# Patient Record
Sex: MALE | Race: WHITE | NOT HISPANIC OR LATINO | URBAN - METROPOLITAN AREA
[De-identification: names, ages, dates, MRNs, and addresses within clinical notes are randomized per-mention and may not be internally consistent; named-entity substitution may affect disease eponyms.]

---

## 2018-11-02 ENCOUNTER — OFFICE VISIT CONVERTED (OUTPATIENT)
Dept: FAMILY MEDICINE CLINIC | Facility: CLINIC | Age: 48
End: 2018-11-02
Attending: NURSE PRACTITIONER

## 2019-03-27 ENCOUNTER — CONVERSION ENCOUNTER (OUTPATIENT)
Dept: FAMILY MEDICINE CLINIC | Facility: CLINIC | Age: 49
End: 2019-03-27

## 2019-03-27 ENCOUNTER — OFFICE VISIT CONVERTED (OUTPATIENT)
Dept: FAMILY MEDICINE CLINIC | Facility: CLINIC | Age: 49
End: 2019-03-27
Attending: NURSE PRACTITIONER

## 2021-05-15 VITALS
DIASTOLIC BLOOD PRESSURE: 79 MMHG | TEMPERATURE: 97.9 F | HEIGHT: 68 IN | OXYGEN SATURATION: 97 % | BODY MASS INDEX: 28.58 KG/M2 | WEIGHT: 188.56 LBS | SYSTOLIC BLOOD PRESSURE: 134 MMHG | RESPIRATION RATE: 16 BRPM | HEART RATE: 66 BPM

## 2021-05-16 VITALS
RESPIRATION RATE: 14 BRPM | OXYGEN SATURATION: 97 % | SYSTOLIC BLOOD PRESSURE: 128 MMHG | HEART RATE: 69 BPM | DIASTOLIC BLOOD PRESSURE: 89 MMHG | WEIGHT: 184.12 LBS | TEMPERATURE: 97.7 F | HEIGHT: 68 IN | BODY MASS INDEX: 27.91 KG/M2

## 2024-07-16 ENCOUNTER — HOSPITAL ENCOUNTER (EMERGENCY)
Facility: HOSPITAL | Age: 54
Discharge: HOME | End: 2024-07-16
Attending: EMERGENCY MEDICINE
Payer: OTHER GOVERNMENT

## 2024-07-16 ENCOUNTER — APPOINTMENT (OUTPATIENT)
Dept: GENERAL RADIOLOGY | Facility: HOSPITAL | Age: 54
End: 2024-07-16
Payer: OTHER GOVERNMENT

## 2024-07-16 VITALS
DIASTOLIC BLOOD PRESSURE: 75 MMHG | BODY MASS INDEX: 28.25 KG/M2 | RESPIRATION RATE: 18 BRPM | SYSTOLIC BLOOD PRESSURE: 119 MMHG | HEART RATE: 64 BPM | HEIGHT: 67 IN | OXYGEN SATURATION: 95 % | TEMPERATURE: 98 F | WEIGHT: 180 LBS

## 2024-07-16 DIAGNOSIS — M13.871 ALLERGIC ARTHRITIS OF RIGHT ANKLE: Primary | ICD-10-CM

## 2024-07-16 PROCEDURE — 99283 EMERGENCY DEPT VISIT LOW MDM: CPT

## 2024-07-16 PROCEDURE — 73610 X-RAY EXAM OF ANKLE: CPT

## 2024-07-16 NOTE — Clinical Note
[facility] EMERGENCY ROOM  [address] [address]  Phone: [phone]  Fax: [phone]    Anthony Hughes was seen and treated in our emergency department on 7/16/2024.  He may return to work on 07/18/2024.         Thank you for choosing [facility].    Gagandeep Taylor PA-C

## 2024-07-16 NOTE — ED PROVIDER NOTES
Time: 3:53 PM EDT  Date of encounter:  7/16/2024  Independent Historian/Clinical History and Information was obtained by:   Patient and Family    History is limited by: N/A    Chief Complaint: Ankle pain      History of Present Illness:  Patient is a 53 y.o. year old male who presents to the emergency department for evaluation of ankle pain.  Patient is complaining of right ankle pain.  He does state he is a  and believes that he is chronic arthritis issues in his ankle however for the past 2 months he has been having worsening pain in the right ankle.  He states that it is mainly at night when the pain becomes severe and he will have intermittent swelling at night.  He does state that he stands all day long for his job at [employer].  Patient denies any known injury.  Patient has not been taking anything for pain control.  Patient has not been evaluated for this prior to being seen in the emergency department today as he does not have a family doctor.  No other complaints.    HPI    Patient Care Team  Primary Care Provider: Provider, No Known    Past Medical History:     No Known Allergies  History reviewed. No pertinent past medical history.  History reviewed. No pertinent surgical history.  History reviewed. No pertinent family history.    Home Medications:  Prior to Admission medications    Not on File        Social History:   Social History     Tobacco Use    Smoking status: Never    Smokeless tobacco: Never   Vaping Use    Vaping status: Never Used   Substance Use Topics    Alcohol use: Not Currently    Drug use: Never         Review of Systems:  Review of Systems   Constitutional:  Negative for fever.   Musculoskeletal:  Positive for arthralgias and joint swelling.   Skin:  Negative for color change and wound.   All other systems reviewed and are negative.       Physical Exam:  /75 (BP Location: Right arm, Patient Position: Sitting)   Pulse 64   Temp 98 °F (36.7 °C) (Oral)   Resp 18   Ht  "170.2 cm (67\")   Wt 81.6 kg (180 lb)   SpO2 95%   BMI 28.19 kg/m²     Physical Exam  Vitals and nursing note reviewed.   Constitutional:       Appearance: Normal appearance. He is normal weight.   HENT:      Head: Normocephalic and atraumatic.      Nose: Nose normal.   Eyes:      Conjunctiva/sclera: Conjunctivae normal.      Pupils: Pupils are equal, round, and reactive to light.   Cardiovascular:      Rate and Rhythm: Normal rate and regular rhythm.      Heart sounds: Normal heart sounds.   Pulmonary:      Effort: Pulmonary effort is normal.      Breath sounds: Normal breath sounds.   Abdominal:      General: Abdomen is flat. There is no distension.   Musculoskeletal:         General: Normal range of motion.      Cervical back: Normal range of motion and neck supple.      Right ankle: No swelling, deformity, ecchymosis or lacerations. Tenderness present over the medial malleolus. Normal range of motion. Normal pulse.      Right foot: Normal.   Skin:     General: Skin is warm and dry.   Neurological:      General: No focal deficit present.      Mental Status: He is alert and oriented to person, place, and time.   Psychiatric:         Mood and Affect: Mood normal.         Behavior: Behavior normal.         Thought Content: Thought content normal.         Judgment: Judgment normal.                Procedures:  Procedures      Medical Decision Making:    Comorbidities that affect care:    None    External Notes reviewed:    None      The following orders were placed and all results were independently analyzed by me:  Orders Placed This Encounter   Procedures    XR Ankle 3+ View Right       Medications Given in the Emergency Department:  Medications - No data to display     ED Course:         Labs:    Lab Results (last 24 hours)       ** No results found for the last 24 hours. **             Imaging:    XR Ankle 3+ View Right    Result Date: 7/16/2024  XR ANKLE 3+ VW RIGHT Date of Exam: 7/16/2024 4:05 PM EDT " Indication: Right ankle pain x 2 months, no known injury pain Comparison: None available. Findings: Bone mineral density is normal. Ankle mortise is intact. No fractures or dislocations. Mild subtalar and talonavicular joint space narrowing.     Impression: No acute finding of the ankle. Mild subtalar and talonavicular joint arthritis. Electronically Signed: Jacquelyn Pham MD  7/16/2024 4:14 PM EDT  Workstation ID: [id number]       Differential Diagnosis and Discussion:    Joint Pain: Differential diagnosis includes but is not limited to polyarticular arthritis, gout, tendinitis, hemarthrosis, septic arthritis, rheumatoid arthritis, bursitis, degenerative joint disease, joint effusion, autoimmune disorder, trauma, and occult neoplasm.    All X-rays impressions were independently interpreted by me.    MDM  Number of Diagnoses or Management Options  Diagnosis management comments: Patient presented to the emergency department for right ankle pain that is chronic.  X-rays were obtained and notes arthritis.  Will begin patient on diclofenac outpatient.       Amount and/or Complexity of Data Reviewed  Tests in the radiology section of CPT®: ordered and reviewed    Risk of Complications, Morbidity, and/or Mortality  Presenting problems: moderate  Diagnostic procedures: low  Management options: low    Patient Progress  Patient progress: stable       Patient Care Considerations:    NARCOTICS: I considered prescribing opiate pain medication as an outpatient, however there is no fracture on x-ray      Consultants/Shared Management Plan:    None    Social Determinants of Health:    Patient is independent, reliable, and has access to care.       Disposition and Care Coordination:    Discharged: The patient is suitable and stable for discharge with no need for consideration of admission.    I have explained the patient´s condition, diagnoses and treatment plan based on the information available to me at this time. I have answered  questions and addressed any concerns. The patient has a good  understanding of the patient´s diagnosis, condition, and treatment plan as can be expected at this point. The vital signs have been stable. The patient´s condition is stable and appropriate for discharge from the emergency department.      The patient will pursue further outpatient evaluation with the primary care physician or other designated or consulting physician as outlined in the discharge instructions. They are agreeable to this plan of care and follow-up instructions have been explained in detail. The patient has received these instructions in written format and has expressed an understanding of the discharge instructions. The patient is aware that any significant change in condition or worsening of symptoms should prompt an immediate return to this or the closest emergency department or call to 911.  I have explained discharge medications and the need for follow up with the patient/caretakers. This was also printed in the discharge instructions. Patient was discharged with the following medications and follow up:      Medication List        New Prescriptions      diclofenac 50 MG EC tablet  Commonly known as: VOLTAREN  Take 1 tablet by mouth 2 (Two) Times a Day As Needed (moderate pain).               Where to Get Your Medications        These medications were sent to [facility]/pharmacy #[id number] - [place], [place] - [address] - [phone]  - [phone]  [address] [place] [place] [address]      Hours: 24-hours Phone: [phone]   diclofenac 50 MG EC tablet      Provider, No Known  [facility]  [place] [address]             Final diagnoses:   Allergic arthritis of right ankle        ED Disposition       ED Disposition   Discharge    Condition   Stable    Comment   --               This medical record created using voice recognition software.             Gagandeep Taylor PA-C  07/16/24 [id number]

## 2024-07-16 NOTE — DISCHARGE INSTRUCTIONS
Take diclofenac as needed for pain control.  Please rest, ice, elevate the ankle when you are home to improve pain and swelling.  Establish a family doctor for further refills of arthritis medication.

## 2024-08-20 ENCOUNTER — OFFICE VISIT (OUTPATIENT)
Dept: FAMILY MEDICINE CLINIC | Facility: CLINIC | Age: 54
End: 2024-08-20
Payer: OTHER GOVERNMENT

## 2024-08-20 VITALS
DIASTOLIC BLOOD PRESSURE: 80 MMHG | HEART RATE: 66 BPM | BODY MASS INDEX: 29.6 KG/M2 | WEIGHT: 188.6 LBS | RESPIRATION RATE: 18 BRPM | TEMPERATURE: 97 F | HEIGHT: 67 IN | SYSTOLIC BLOOD PRESSURE: 130 MMHG | OXYGEN SATURATION: 99 %

## 2024-08-20 DIAGNOSIS — M79.671 PAIN IN BOTH FEET: ICD-10-CM

## 2024-08-20 DIAGNOSIS — M79.672 PAIN IN BOTH FEET: ICD-10-CM

## 2024-08-20 DIAGNOSIS — Z12.11 COLON CANCER SCREENING: ICD-10-CM

## 2024-08-20 DIAGNOSIS — Z12.5 SCREENING FOR PROSTATE CANCER: ICD-10-CM

## 2024-08-20 DIAGNOSIS — H93.13 TINNITUS OF BOTH EARS: ICD-10-CM

## 2024-08-20 DIAGNOSIS — Z00.00 ANNUAL PHYSICAL EXAM: Primary | ICD-10-CM

## 2024-08-20 DIAGNOSIS — M19.90 ARTHRITIS: ICD-10-CM

## 2024-08-20 PROCEDURE — 99213 OFFICE O/P EST LOW 20 MIN: CPT | Performed by: FAMILY MEDICINE

## 2024-08-20 PROCEDURE — 99386 PREV VISIT NEW AGE 40-64: CPT | Performed by: FAMILY MEDICINE

## 2024-08-20 RX ORDER — DICLOFENAC SODIUM 75 MG/1
75 TABLET, DELAYED RELEASE ORAL 2 TIMES DAILY
Qty: 180 TABLET | Refills: 3 | Status: SHIPPED | OUTPATIENT
Start: 2024-08-20

## 2024-08-20 RX ORDER — TRIAMCINOLONE ACETONIDE 0.25 MG/G
1 OINTMENT TOPICAL 2 TIMES DAILY
Qty: 56 G | Refills: 0 | Status: CANCELLED | OUTPATIENT
Start: 2024-08-20 | End: 2024-09-17

## 2024-08-20 RX ORDER — TRIAMCINOLONE ACETONIDE 1 MG/G
1 CREAM TOPICAL 2 TIMES DAILY
Qty: 80 G | Refills: 1 | Status: SHIPPED | OUTPATIENT
Start: 2024-08-20

## 2024-08-20 NOTE — PROGRESS NOTES
"Chief Complaint  Knee Pain (Right knee.), Joint Swelling (Right elbow.), Rash (Head and face, dry skin, x10 years.), Diarrhea, Establish Care, and Hearing Problem (X1 year or longer.)    Subjective        Anthony Hughes presents to [facility]  History of Present Illness  Mr. Hughes is a 53yo gentleman, who served in the [employer], here to establish care.  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    He has foot pain in both feet but is bothered by the right side more than than left.  He uses supplements to help.  Will refer to podiatry.    He has ringing in his ears and decreased hearing for the last 10+ years.  Will refer to ENT/Audiologist.    Rash on head/face (1 year, scaly)    PMH  Diarrhea, consumes lots of caffeine  Tinnitus, not seen ENT/audiologist  Elbow bursitis, right    Surgical History  Meniscal repair, right 2014/15  Septum repair, 2007  Left hand repair from break (in younger days)    Family History  Mom:  glaucoma, gout, CAD, MI, DM2  Dad:  none  Grandfather, paternal:  colon cancer    Social History  Tobacco:  never  EtOH:  never  Recreational Drugs:  never    Allergies: NKDA      Objective   Vital Signs:  /80 (BP Location: Right arm, Patient Position: Sitting, Cuff Size: Adult)   Pulse 66   Temp 97 °F (36.1 °C) (Temporal)   Resp 18   Ht 170.2 cm (67\")   Wt 85.5 kg (188 lb 9.6 oz)   SpO2 99%   BMI 29.54 kg/m²   Estimated body mass index is 29.54 kg/m² as calculated from the following:    Height as of this encounter: 170.2 cm (67\").    Weight as of this encounter: 85.5 kg (188 lb 9.6 oz).          Physical Exam  Vitals reviewed.   Constitutional:       Appearance: He is well-developed and overweight.   HENT:      Head: Normocephalic and atraumatic.      Right Ear: External ear normal.      Left Ear: External ear normal.      Mouth/Throat:      Pharynx: No oropharyngeal exudate.   Eyes:      " Conjunctiva/sclera: Conjunctivae normal.      Pupils: Pupils are equal, round, and reactive to light.   Neck:      Vascular: No carotid bruit.   Cardiovascular:      Rate and Rhythm: Normal rate and regular rhythm.      Heart sounds: No murmur heard.     No friction rub. No gallop.   Pulmonary:      Effort: Pulmonary effort is normal.      Breath sounds: Normal breath sounds. No wheezing or rhonchi.   Abdominal:      General: There is no distension.   Skin:     General: Skin is warm and dry.   Neurological:      Mental Status: He is alert and oriented to person, place, and time.      Cranial Nerves: No cranial nerve deficit.      Motor: No weakness.   Psychiatric:         Mood and Affect: Mood and affect normal.         Behavior: Behavior normal.         Thought Content: Thought content normal.         Judgment: Judgment normal.        Result Review :                      Assessment and Plan   Diagnoses and all orders for this visit:    1. Annual physical exam (Primary)  Assessment & Plan:  The patient was encouraged to always wear their seatbelt and never text and drive.  They were encouraged to get 7 to 8 hours sleep at night.  They were encouraged to exercise on a regular basis.  Screening labs were reviewed at today's visit and manage according to findings.    Orders:  -     Lipid panel; Future  -     CBC w AUTO Differential; Future  -     TSH+Free T4; Future  -     Comprehensive metabolic panel; Future  -     Urinalysis With Microscopic - Urine, Clean Catch; Future    2. Pain in both feet  -     Ambulatory Referral to Podiatry    3. 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  Will refer to ENT/audiology.    Orders:  -     Ambulatory Referral to ENT (Otolaryngology)    4. Arthritis  -     diclofenac (VOLTAREN) 75 MG EC tablet; Take 1 tablet by mouth 2 (Two) Times a Day.  Dispense: 180 tablet; Refill: 3    5. Colon cancer  screening  -     Ambulatory Referral For Screening Colonoscopy    6. Screening for prostate cancer  -     PSA SCREENING; Future        -     triamcinolone (KENALOG) 0.1 % cream; Apply 1 Application topically to the appropriate area as directed 2 (Two) Times a Day.  Dispense: 80 g; Refill: 1           Follow Up   Return in about 1 year (around 8/20/2025).  Patient was given instructions and counseling regarding his condition or for health maintenance advice. Please see specific information pulled into the AVS if appropriate.

## 2024-08-20 NOTE — ASSESSMENT & PLAN NOTE
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  Will refer to ENT/audiology.

## 2024-08-21 ENCOUNTER — PATIENT ROUNDING (BHMG ONLY) (OUTPATIENT)
Dept: FAMILY MEDICINE CLINIC | Facility: CLINIC | Age: 54
End: 2024-08-21
Payer: OTHER GOVERNMENT

## 2024-10-02 ENCOUNTER — OFFICE VISIT (OUTPATIENT)
Dept: PODIATRY | Facility: CLINIC | Age: 54
End: 2024-10-02
Payer: OTHER GOVERNMENT

## 2024-10-02 VITALS
OXYGEN SATURATION: 95 % | HEART RATE: 69 BPM | BODY MASS INDEX: 29.35 KG/M2 | SYSTOLIC BLOOD PRESSURE: 117 MMHG | DIASTOLIC BLOOD PRESSURE: 84 MMHG | WEIGHT: 187 LBS | TEMPERATURE: 97.5 F | HEIGHT: 67 IN

## 2024-10-02 DIAGNOSIS — M19.071 PRIMARY OSTEOARTHRITIS OF RIGHT ANKLE: ICD-10-CM

## 2024-10-02 DIAGNOSIS — M79.671 RIGHT FOOT PAIN: ICD-10-CM

## 2024-10-02 DIAGNOSIS — M21.41 PES PLANUS OF BOTH FEET: Primary | ICD-10-CM

## 2024-10-02 DIAGNOSIS — M21.42 PES PLANUS OF BOTH FEET: Primary | ICD-10-CM

## 2024-10-02 DIAGNOSIS — M72.2 PLANTAR FASCIITIS: ICD-10-CM

## 2024-10-03 NOTE — PROGRESS NOTES
[facility] - PODIATRY    Today's Date: 10/03/24    Patient Name: Anthony Hughes  MRN: [id number]  CSN: [id number]  PCP: Riya Soler DO,   Referring Provider: Riya Soler DO    SUBJECTIVE     Chief Complaint   Patient presents with    Left Foot - Establish Care, Pain     Wants custom inserts     Right Foot - Establish Care, Pain     Wants custom inserts      HPI: Anthony Hughes, a 54 y.o.male, presents to clinic.    Patient is a 54-year-old male presenting with right foot pain.  Patient states that he has had custom inserts in the past due to his flatfeet.  Patient states that her right is worse than the left.  It becomes more sore.  Patient is very active.  Past Medical History:   Diagnosis Date    Callus     Difficulty walking     Fallen arches     Foot pain, bilateral     Ingrown toenail     Tinea pedis      History reviewed. No pertinent surgical history.  Family History   Problem Relation Age of Onset    Diabetes Mother         Mom has had diabetes for over 30 years.    Heart disease Mother         Mom has had around 5 heart attacks     Social History     Socioeconomic History    Marital status:    Tobacco Use    Smoking status: Never     Passive exposure: Never    Smokeless tobacco: Never   Vaping Use    Vaping status: Never Used   Substance and Sexual Activity    Alcohol use: Never    Drug use: Never    Sexual activity: Not Currently     Partners: Female     Birth control/protection: None     No Known Allergies  Current Outpatient Medications   Medication Sig Dispense Refill    diclofenac (VOLTAREN) 75 MG EC tablet Take 1 tablet by mouth 2 (Two) Times a Day. 180 tablet 3    triamcinolone (KENALOG) 0.1 % cream Apply 1 Application topically to the appropriate area as directed 2 (Two) Times a Day. 80 g 1     No current facility-administered medications for this visit.     Review of Systems   Musculoskeletal:         Right foot pain       OBJECTIVE     Vitals:    10/02/24  "1501   BP: 117/84   Pulse: 69   Temp: 97.5 °F (36.4 °C)   SpO2: 95%       No results found for: \"WBC\", \"RBC\", \"HGB\", \"HCT\", \"MCV\", \"MCH\", \"MCHC\", \"RDW\", \"RDWSD\", \"MPV\", \"PLT\", \"NEUTRORELPCT\", \"LYMPHORELPCT\", \"MONORELPCT\", \"EOSRELPCT\", \"BASORELPCT\", \"AUTOIGPER\", \"NEUTROABS\", \"LYMPHSABS\", \"MONOSABS\", \"EOSABS\", \"BASOSABS\", \"AUTOIGNUM\", \"NRBC\"      No results found for: \"GLUCOSE\", \"BUN\", \"CREATININE\", \"EGFRIFNONA\", \"EGFRIFAFRI\", \"BCR\", \"K\", \"CO2\", \"CALCIUM\", \"PROTENTOTREF\", \"ALBUMIN\", \"LABIL2\", \"BILIRUBIN\", \"AST\", \"ALT\"    Patient seen in no apparent distress.      PHYSICAL EXAM:     Foot/Ankle Exam    GENERAL  Appearance:  appears stated age  Orientation:  AAOx3  Affect:  appropriate  Gait:  unimpaired  Assistance:  independent  Right shoe gear: casual shoe  Left shoe gear: casual shoe    VASCULAR     Right Foot Vascularity   Normal vascular exam    Dorsalis pedis:  2+  Posterior tibial:  2+  Skin temperature:  warm  Edema grading:  None  CFT:  < 3 seconds  Pedal hair growth:  Present  Varicosities:  none     Left Foot Vascularity   Normal vascular exam    Dorsalis pedis:  2+  Posterior tibial:  2+  Skin temperature:  warm  Edema grading:  None  CFT:  < 3 seconds  Pedal hair growth:  Present  Varicosities:  none     NEUROLOGIC     Right Foot Neurologic   Normal sensation    Light touch sensation: normal  Vibratory sensation: normal  Hot/Cold sensation: normal  Protective Sensation using Roland-Kasey Monofilament:   Sites intact: 10  Sites tested: 10     Left Foot Neurologic   Normal sensation    Light touch sensation: normal  Vibratory sensation: normal  Hot/Cold sensation:  normal  Protective Sensation using Roland-Kasey Monofilament:   Sites intact: 10  Sites tested: 10    MUSCULOSKELETAL     Right Foot Musculoskeletal   Tenderness:  deltoid ligament tenderness and posterior tibial tendon tenderness    Arch:  Pes planus     Left Foot Musculoskeletal   Arch:  Pes planus    MUSCLE STRENGTH     Right Foot Muscle " Strength   Foot dorsiflexion:  4  Foot plantar flexion:  4  Foot inversion:  4  Foot eversion:  4     Left Foot Muscle Strength   Foot dorsiflexion:  4  Foot plantar flexion:  4  Foot inversion:  4  Foot eversion:  4    RANGE OF MOTION     Right Foot Range of Motion   Foot and ankle ROM within normal limits       Left Foot Range of Motion   Foot and ankle ROM within normal limits      DERMATOLOGIC      Right Foot Dermatologic   Skin  Right foot skin is intact.      Left Foot Dermatologic   Skin  Left foot skin is intact.       RADIOLOGY:        No results found.    ASSESSMENT/PLAN     Diagnoses and all orders for this visit:    1. Pes planus of both feet (Primary)  -     Ambulatory Referral For Orthotics    2. Right foot pain    3. Plantar fasciitis    4. Primary osteoarthritis of right ankle        Comprehensive lower extremity examination and evaluation was performed.    Patient to begin stretching exercises and icing in the evening as tolerated. Discussed compression therapy and resting the extremity.  Anti-inflammatory medication to begin taking if okay by PCP.    Prescription for custom orthotics given.    Return to clinic in 3 months or as needed    Discussed findings and treatment plan including risks, benefits, and treatment options with patient in detail. Patient agreed with treatment plan.    Medications and allergies reviewed.  Reviewed available lab values along with other pertinent labs.  These were discussed with the patient.    An After Visit Summary was printed and given to the patient at discharge, including (if requested) any available informative/educational handouts regarding diagnosis, treatment, or medications. All questions were answered to patient/family satisfaction. Should symptoms fail to improve or worsen they agree to call or return to clinic or to go to the Emergency Department. Discussed the importance of following up with any needed screening tests/labs/specialist appointments and any  requested follow-up recommended by me today. Importance of maintaining follow-up discussed and patient accepts that missed appointments can delay diagnosis and potentially lead to worsening of conditions.    Return in about 3 months (around 1/2/2025)., or sooner if acute issues arise.    This document has been electronically signed by Napoleon Arora DPM on October 3, 2024 07:33 EDT

## 2024-10-04 ENCOUNTER — PATIENT ROUNDING (BHMG ONLY) (OUTPATIENT)
Dept: PODIATRY | Facility: CLINIC | Age: 54
End: 2024-10-04
Payer: OTHER GOVERNMENT

## 2024-10-04 NOTE — PROGRESS NOTES
A [employer] message has been sent to the patient for PATIENT ROUNDING with [facility]